# Patient Record
Sex: MALE | Employment: FULL TIME | URBAN - METROPOLITAN AREA
[De-identification: names, ages, dates, MRNs, and addresses within clinical notes are randomized per-mention and may not be internally consistent; named-entity substitution may affect disease eponyms.]

---

## 2020-02-11 ENCOUNTER — HOSPITAL ENCOUNTER (EMERGENCY)
Age: 20
Discharge: HOME | End: 2020-02-11
Attending: EMERGENCY MEDICINE
Payer: COMMERCIAL

## 2020-02-11 ENCOUNTER — APPOINTMENT (OUTPATIENT)
Dept: GENERAL RADIOLOGY | Age: 20
End: 2020-02-11
Attending: PHYSICIAN ASSISTANT
Payer: COMMERCIAL

## 2020-02-11 VITALS
HEART RATE: 88 BPM | TEMPERATURE: 98.3 F | DIASTOLIC BLOOD PRESSURE: 90 MMHG | RESPIRATION RATE: 18 BRPM | OXYGEN SATURATION: 100 % | SYSTOLIC BLOOD PRESSURE: 157 MMHG

## 2020-02-11 DIAGNOSIS — S62.622B OPEN DISPLACED FRACTURE OF MIDDLE PHALANX OF RIGHT MIDDLE FINGER, INITIAL ENCOUNTER: ICD-10-CM

## 2020-02-11 DIAGNOSIS — S68.119A AMPUTATION OF TIP OF FINGER, INITIAL ENCOUNTER: Primary | ICD-10-CM

## 2020-02-11 DIAGNOSIS — S62.623B OPEN DISPLACED FRACTURE OF MIDDLE PHALANX OF LEFT MIDDLE FINGER, INITIAL ENCOUNTER: ICD-10-CM

## 2020-02-11 DIAGNOSIS — S62.609B: ICD-10-CM

## 2020-02-11 PROCEDURE — 74011250636 HC RX REV CODE- 250/636: Performed by: NURSE PRACTITIONER

## 2020-02-11 PROCEDURE — 96365 THER/PROPH/DIAG IV INF INIT: CPT

## 2020-02-11 PROCEDURE — 90715 TDAP VACCINE 7 YRS/> IM: CPT | Performed by: PHYSICIAN ASSISTANT

## 2020-02-11 PROCEDURE — 90471 IMMUNIZATION ADMIN: CPT

## 2020-02-11 PROCEDURE — 74011250637 HC RX REV CODE- 250/637: Performed by: NURSE PRACTITIONER

## 2020-02-11 PROCEDURE — 73130 X-RAY EXAM OF HAND: CPT

## 2020-02-11 PROCEDURE — 74011000250 HC RX REV CODE- 250: Performed by: NURSE PRACTITIONER

## 2020-02-11 PROCEDURE — 99283 EMERGENCY DEPT VISIT LOW MDM: CPT

## 2020-02-11 PROCEDURE — 75810000294 HC INTERM/LAYERED WND RPR

## 2020-02-11 PROCEDURE — 74011250636 HC RX REV CODE- 250/636: Performed by: PHYSICIAN ASSISTANT

## 2020-02-11 PROCEDURE — 96366 THER/PROPH/DIAG IV INF ADDON: CPT

## 2020-02-11 RX ORDER — IBUPROFEN 600 MG/1
600 TABLET ORAL
Qty: 20 TAB | Refills: 0 | Status: SHIPPED | OUTPATIENT
Start: 2020-02-11

## 2020-02-11 RX ORDER — OXYCODONE AND ACETAMINOPHEN 5; 325 MG/1; MG/1
1 TABLET ORAL
Qty: 8 TAB | Refills: 0 | Status: SHIPPED | OUTPATIENT
Start: 2020-02-11 | End: 2020-02-13 | Stop reason: DRUGHIGH

## 2020-02-11 RX ORDER — CEFAZOLIN SODIUM 1 G/3ML
1 INJECTION, POWDER, FOR SOLUTION INTRAMUSCULAR; INTRAVENOUS
Status: DISCONTINUED | OUTPATIENT
Start: 2020-02-11 | End: 2020-02-11

## 2020-02-11 RX ORDER — CEFAZOLIN SODIUM 1 G/3ML
1 INJECTION, POWDER, FOR SOLUTION INTRAMUSCULAR; INTRAVENOUS
Status: DISCONTINUED | OUTPATIENT
Start: 2020-02-11 | End: 2020-02-11 | Stop reason: SDUPTHER

## 2020-02-11 RX ORDER — LIDOCAINE HYDROCHLORIDE 10 MG/ML
5 INJECTION, SOLUTION EPIDURAL; INFILTRATION; INTRACAUDAL; PERINEURAL ONCE
Status: COMPLETED | OUTPATIENT
Start: 2020-02-11 | End: 2020-02-11

## 2020-02-11 RX ORDER — CEPHALEXIN 500 MG/1
500 CAPSULE ORAL 4 TIMES DAILY
Qty: 28 CAP | Refills: 0 | Status: SHIPPED | OUTPATIENT
Start: 2020-02-11 | End: 2020-02-18

## 2020-02-11 RX ORDER — OXYCODONE AND ACETAMINOPHEN 5; 325 MG/1; MG/1
1 TABLET ORAL
Status: COMPLETED | OUTPATIENT
Start: 2020-02-11 | End: 2020-02-11

## 2020-02-11 RX ORDER — CEFAZOLIN SODIUM 2 G/50ML
2 SOLUTION INTRAVENOUS ONCE
Status: COMPLETED | OUTPATIENT
Start: 2020-02-11 | End: 2020-02-11

## 2020-02-11 RX ADMIN — LIDOCAINE HYDROCHLORIDE 5 ML: 10 INJECTION, SOLUTION EPIDURAL; INFILTRATION; INTRACAUDAL; PERINEURAL at 15:07

## 2020-02-11 RX ADMIN — CEFAZOLIN SODIUM 2 G: 2 SOLUTION INTRAVENOUS at 15:18

## 2020-02-11 RX ADMIN — TETANUS TOXOID, REDUCED DIPHTHERIA TOXOID AND ACELLULAR PERTUSSIS VACCINE, ADSORBED 0.5 ML: 5; 2.5; 8; 8; 2.5 SUSPENSION INTRAMUSCULAR at 15:00

## 2020-02-11 RX ADMIN — OXYCODONE HYDROCHLORIDE AND ACETAMINOPHEN 1 TABLET: 5; 325 TABLET ORAL at 15:00

## 2020-02-11 NOTE — ED PROVIDER NOTES
EMERGENCY DEPARTMENT HISTORY AND PHYSICAL EXAM    1:35 PM      Date: 2/11/2020  Patient Name: William Batista    History of Presenting Illness     Chief Complaint   Patient presents with    Finger Pain         History Provided By: Patient    Additional History (Context): William Batista is a 23 y.o. male with  No significant past medical history who presents with injury to both middle fingers . Pt was at work and got both of his 3rd digits caught in between metal scaffold. Pt lost tip of left finger and acquired a laceration to right tip of middle finger. Pt does not know when his last tetanus shot was. Pt reports constant pain. PCP: Michell Anne MD    Current Facility-Administered Medications   Medication Dose Route Frequency Provider Last Rate Last Dose    ceFAZolin (ANCEF) 2g IVPB in 50 mL D5W  2 g IntraVENous ONCE Master Owen NP         Current Outpatient Medications   Medication Sig Dispense Refill    cephALEXin (KEFLEX) 500 mg capsule Take 1 Cap by mouth four (4) times daily for 7 days. 28 Cap 0    oxyCODONE-acetaminophen (PERCOCET) 5-325 mg per tablet Take 1 Tab by mouth every six (6) hours as needed for Pain for up to 3 days. Max Daily Amount: 4 Tabs. 8 Tab 0    ibuprofen (MOTRIN) 600 mg tablet Take 1 Tab by mouth every six (6) hours as needed for Pain. 20 Tab 0       Past History     Past Medical History:  No past medical history on file. Past Surgical History:  No past surgical history on file. Family History:  No family history on file. Social History:  Social History     Tobacco Use    Smoking status: Not on file   Substance Use Topics    Alcohol use: Not on file    Drug use: Not on file       Allergies:  No Known Allergies      Review of Systems       Review of Systems   Constitutional: Negative for chills and fever. Respiratory: Negative for shortness of breath. Cardiovascular: Negative for chest pain. Gastrointestinal: Negative for abdominal pain, nausea and vomiting. Skin: Positive for wound. Negative for rash. Neurological: Negative for weakness. All other systems reviewed and are negative. Physical Exam     Visit Vitals  /90 (BP 1 Location: Right arm, BP Patient Position: At rest)   Pulse 88   Temp 98.3 °F (36.8 °C)   Resp 18   SpO2 100%         Physical Exam  Vitals signs reviewed. Constitutional:       General: He is not in acute distress. Appearance: Normal appearance. He is well-developed. He is not ill-appearing or toxic-appearing. HENT:      Head: Normocephalic and atraumatic. Eyes:      Conjunctiva/sclera: Conjunctivae normal.      Pupils: Pupils are equal, round, and reactive to light. Neck:      Musculoskeletal: Normal range of motion. Trachea: Trachea normal.   Cardiovascular:      Rate and Rhythm: Normal rate and regular rhythm. Pulmonary:      Effort: Pulmonary effort is normal.      Breath sounds: Normal breath sounds. Abdominal:      General: Bowel sounds are normal. There is no distension or abdominal bruit. Palpations: Abdomen is soft. Abdomen is not rigid. There is no shifting dullness, fluid wave, mass or pulsatile mass. Tenderness: There is no abdominal tenderness. There is no guarding. Negative signs include Bush's sign and McBurney's sign. Musculoskeletal:      Right hand: He exhibits laceration. Decreased sensation is not present in the ulnar distribution, is not present in the medial distribution and is not present in the radial distribution. He exhibits no finger abduction, no thumb/finger opposition and no wrist extension trouble. Left hand: He exhibits tenderness and laceration. Normal strength noted. He exhibits no finger abduction, no thumb/finger opposition and no wrist extension trouble. Hands:       Comments: Tip of left finger missing. Bone exposed. Most of the nail is gone, only a piece of lunula in place. Painful. Bleeding controlled.    Laceration to right tip of finger about 3 cm, nail intact. Muscle tissue exposed, minimal bleeding. Bone not visible. Radial pulses intact. Neurological:      General: No focal deficit present. Mental Status: He is alert and oriented to person, place, and time. Wound Repair  Date/Time: 2/11/2020 3:09 PM  Preparation: skin prepped with Betadine, skin prepped with ChloraPrep and sterile field established  Location details: right long finger  Wound length:2.6 - 7.5 cm  Anesthesia: digital block and local infiltration    Anesthesia:  Local Anesthetic: lidocaine 1% without epinephrine  Anesthetic total: 8 mL  Foreign bodies: no foreign bodies  Irrigation solution: saline  Irrigation method: syringe  Debridement: extensive  Wound skin closure material used: 3-0 nylon. Number of sutures: 5  Technique: simple and interrupted  Approximation: loose  Dressing: tube gauze  Patient tolerance: Patient tolerated the procedure well with no immediate complications  My total time at bedside, performing this procedure was 1-15 minutes. Diagnostic Study Results     Labs -  No results found for this or any previous visit (from the past 12 hour(s)). Radiologic Studies -   XR HAND RT MIN 3 V   Final Result   IMPRESSION:      Minimally displaced fracture of the distal tuft of the right lung finger, as   described. No other significant bony abnormality is seen. XR HAND LT MIN 3 V   Final Result   IMPRESSION:      Partial amputation of the distal tip of the left long finger including the soft   tissues and small distal portion of the tuft. Additional fracture of the distal   staff with subtle volar angulation. No other significant bony abnormality is seen. Medical Decision Making   I am the first provider for this patient. I reviewed the vital signs, available nursing notes, past medical history, past surgical history, family history and social history. Vital Signs-Reviewed the patient's vital signs.     Records Reviewed: Nursing Notes and Old Medical Records (Time of Review: 1:35 PM)    ED Course: Progress Notes, Reevaluation, and Consults:      Provider Notes (Medical Decision Making):   Pt in pain from bilateral finger injuries. Pt had left tip of middle finger amputated at work from a crush injury and acquired a finger lac to the right tip of finger. Xray showed a small minimally displaced fracture of the distal volar  portion of the distal tuft of the distal phalanx of the long finger of right hand. Left hand xray showed partial amputation of the distal tip of the left long finger including the soft tissues and small distal portion of the tuft. Additional fracture of the distal staff with subtle volar angulation. Both wounds were extensively irrigated and soaked with betadine and normal saline prior to repair. Right finger lac was repaired. Stitches placed, used nail as anchor. Tdap updated. Iv antibiotics ordered. Bilateral finger splints placed. 26 Consulted with Dr. Dudley Garcia orthopedic and suggested to have pt go to the office tomorrow at 8am for surgery of the left fingertip. Pt to be NPO after midnight. Pt to have IV antibiotics during visit. 1430 Patient and mother made aware of plan of care. Mother stated she spoke to her orthopedic doctor at [facility] and they will see patient this evening.   3:29 PM Mother reports she will go to Dr. Dudley Garcia as scheduled tomorrow morning instead of her orthopedic because they canceled the appointment. Diagnosis     Clinical Impression:   1. Amputation of tip of finger, initial encounter    2. Open nondisplaced fracture of phalanx of finger, unspecified finger, unspecified phalanx, initial encounter    3. Open displaced fracture of middle phalanx of right middle finger, initial encounter    4.  Open displaced fracture of middle phalanx of left middle finger, initial encounter        Disposition: home     Follow-up Information     Follow up With Specialties Details Why Contact Info    Lacey Nielsen MD [facility]  For suture removal [address]  [phone]      [facility] EMERGENCY DEPT Emergency Medicine  If symptoms worsen [address]  [phone]    Jerrod Greene MD Orthopedic Surgery In 1 day tomorrow at [address], fasting [address] [address]      [address] [facility] [place]  Schedule an appointment as soon as possible for a visit as scheduled [address] [facility]  [address] [address] [phone]           Patient's Medications   Start Taking    CEPHALEXIN (KEFLEX) 500 MG CAPSULE    Take 1 Cap by mouth four (4) times daily for 7 days. IBUPROFEN (MOTRIN) 600 MG TABLET    Take 1 Tab by mouth every six (6) hours as needed for Pain. OXYCODONE-ACETAMINOPHEN (PERCOCET) 5-325 MG PER TABLET    Take 1 Tab by mouth every six (6) hours as needed for Pain for up to 3 days. Max Daily Amount: 4 Tabs. Continue Taking    No medications on file   These Medications have changed    No medications on file   Stop Taking    No medications on file       Dictation disclaimer:  Please note that this dictation was completed with [email or web address], the computer voice recognition software. Quite often unanticipated grammatical, syntax, homophones, and other interpretive errors are inadvertently transcribed by the computer software. Please disregard these errors. Please excuse any errors that have escaped final proofreading.

## 2020-02-11 NOTE — DISCHARGE INSTRUCTIONS
Patient Education        Finger Fracture: Care Instructions  Your Care Instructions    Breaks in the bones of the finger usually heal well in about 3 to 4 weeks. The pain and swelling from a broken finger can last for weeks. But it should steadily improve, starting a few days after you break it. It is very important that you wear and take care of the cast or splint exactly as your doctor tells you to so that your finger heals properly and does not end up crooked. Wearing a splint may interfere with your normal activities. Ask for help with daily tasks if you need it. You heal best when you take good care of yourself. Eat a variety of healthy foods, and don't smoke. Follow-up care is a key part of your treatment and safety. Be sure to make and go to all appointments, and call your doctor if you are having problems. It's also a good idea to know your test results and keep a list of the medicines you take. How can you care for yourself at home? · If your doctor put a splint on your finger, wear the splint exactly as directed. Do not remove it until your doctor says that you can. · Keep your hand raised above the level of your heart as much as you can. This will help reduce swelling. · Put ice or a cold pack on your finger for 10 to 20 minutes at a time. Try to do this every 1 to 2 hours for the next 3 days (when you are awake) or until the swelling goes down. Put a thin cloth between the ice and your skin. Keep the splint dry. · Be safe with medicines. Take pain medicines exactly as directed. ? If the doctor gave you a prescription medicine for pain, take it as prescribed. ? If you are not taking a prescription pain medicine, ask your doctor if you can take an over-the-counter medicine. When should you call for help? Call 911 anytime you think you may need emergency care.  For example, call if:    · Your finger is cool or pale or changes color.    Call your doctor now or seek immediate medical care if:    · Your pain gets much worse.     · You have tingling, weakness, or numbness in your finger.     · You have signs of infection, such as:  ? Increased pain, swelling, warmth, or redness. ? Red streaks leading from the area. ? Pus draining from the area. ? Swollen lymph nodes in your neck, armpits, or groin. ? A fever.    Watch closely for changes in your health, and be sure to contact your doctor if:    · Your finger is not steadily improving. Where can you learn more? Go to http://luis-iesha.info/. Enter R780 in the search box to learn more about \"Finger Fracture: Care Instructions. \"  Current as of: June 26, 2019  Content Version: 12.2  © 3441-1438 [employer]. Care instructions adapted under license by [employer] (which disclaims liability or warranty for this information). If you have questions about a medical condition or this instruction, always ask your healthcare professional. [place] [address] any warranty or liability for your use of this information. Patient Education        Fingertip Amputation: Care Instructions  Your Care Instructions  Fingertip amputation is a common injury. Treatment depends on how much skin, tissue, bone, and nail were damaged and how much of your finger or thumb was cut off. The doctor may have put stitches in your finger. You may need to see a hand surgeon for more treatment. Your fingertips have many nerves and are very sensitive, so the injury may be very painful. Recovery can take several weeks. Your finger may be sensitive to cold and painful for a year or more. You probably will have a splint to protect your finger as it heals. It is very important that you wear the splint exactly as your doctor tells you. Wearing a splint may interfere with your normal activities. Ask for help with daily tasks if you need it. The doctor has checked you carefully, but problems can develop later.  If you notice any problems or new symptoms, get medical treatment right away. Follow-up care is a key part of your treatment and safety. Be sure to make and go to all appointments, and call your doctor if you are having problems. It's also a good idea to know your test results and keep a list of the medicines you take. How can you care for yourself at home? · If your doctor put a splint on your finger, wear the splint exactly as directed. Keep the splint dry. Do not remove it until your doctor says you can. · Keep the cut dry for the first 24 to 48 hours. After this, you can shower if your doctor says it is okay. Pat the cut dry. · Don't soak the cut, such as in a bathtub. Your doctor will tell you when it's safe to get the cut wet. · If your doctor told you how to care for your cut, follow your doctor's instructions. If you did not get instructions, follow this general advice:  ? After the first 24 to 48 hours, wash around the cut with clean water 2 times a day. Don't use hydrogen peroxide or alcohol, which can slow healing. ? You may cover the cut with a thin layer of petroleum jelly, such as Vaseline, and a nonstick bandage. ? Apply more petroleum jelly and replace the bandage as needed. ? Prop up the sore hand on a pillow anytime you sit or lie down during the next 3 days. Try to keep it above the level of your heart. This will help reduce swelling. ? Avoid any activity that could cause your cut to reopen. ? Do not remove the stitches on your own. Your doctor will tell you when to come back to have the stitches removed. · Be safe with medicines. Take pain medicines exactly as directed. ? If the doctor gave you a prescription medicine for pain, take it as prescribed. ? If you are not taking a prescription pain medicine, ask your doctor if you can take an over-the-counter medicine. · If your doctor prescribed antibiotics, take them as directed. Do not stop taking them just because you feel better.  You need to take the full course of antibiotics. When should you call for help? Call your doctor now or seek immediate medical care if:    · You have new pain, or your pain gets worse.     · The skin near the wound is cool or pale or changes color.     · The wound starts to bleed, and blood soaks through the bandage. Oozing small amounts of blood is normal.     · Your wound comes open.     · You have symptoms of infection, such as:  ? Increased pain, swelling, warmth, or redness near the wound. ? Red streaks leading from the wound. ? Pus draining from the wound. ? A fever.    Watch closely for changes in your health, and be sure to contact your doctor if:    · You do not get better as expected. Where can you learn more? Go to http://luis-iesha.info/. Enter 725 0628 in the search box to learn more about \"Fingertip Amputation: Care Instructions. \"  Current as of: June 26, 2019  Content Version: 12.2  © 0330-1918 [employer]. Care instructions adapted under license by [employer] (which disclaims liability or warranty for this information). If you have questions about a medical condition or this instruction, always ask your healthcare professional. [place] [address] any warranty or liability for your use of this information. Patient Education        Wearing a Splint: Care Instructions  Your Care Instructions    A splint protects a broken bone or other injury. If you have a removable splint, follow your doctor's instructions and only remove the splint if your doctor says it's okay. Most splints can be adjusted. Your doctor will show you how to do this and will tell you when you might need to adjust the splint. A splint is sometimes called a brace. You may also hear it called an immobilizer. An immobilizer, such as a splint or cast, keeps you from moving the injured area. You may get a splint that's already factory-made.  Or your doctor might make your splint from plaster or fiberglass. Some splints have a built-in air cushion. Air pads are inflated to hold the injured area in place. Follow-up care is a key part of your treatment and safety. Be sure to make and go to all appointments, and call your doctor if you are having problems. It's also a good idea to know your test results and keep a list of the medicines you take. How can you care for yourself at home? General care  · Follow your doctor's instructions on how much weight you can put on your injured limb. · If the fingers or toes on the limb with the splint were not injured, wiggle them every now and then. This helps move the blood and fluids in the injured limb. · Prop up the injured limb on a pillow when you ice it or anytime you sit or lie down during the next 3 days. Try to keep it above the level of your heart. This will help reduce swelling. · Put ice or cold packs on the limb for 10 to 20 minutes at a time. Try to do this every 1 to 2 hours for the next 3 days (when you are awake) or until the swelling goes down. Be careful not to get the splint wet. Put a thin cloth between the ice and your skin. If your splint is removable, ask your doctor if you can take it off when you use ice. · If you have an adjustable splint that feels too tight, loosen it slightly. · Keep up your muscle strength and tone as much as you can while protecting your injured limb. Your doctor may want you to tense and relax the muscles protected by the splint. Check with your doctor or your physical or occupational therapist for instructions. Splint and skin care  · If your splint is not to be removed, try blowing cool air from a hair dryer or fan into the splint to help relieve itching. Never stick items under your splint to scratch the skin. · Do not use oils or lotions near your splint.  If the skin becomes red or sore around the edge of the splint, you may pad the edges with a soft material, such as moleskin, or use tape to cover the edges. · If you're allowed to take your splint off, be sure your skin is dry before you put it back on. Be careful not to put the splint on too tightly. · Check the skin under the splint every day. If you can't remove the splint, check the skin around the edges. Tell your doctor if you see redness or sores. Water and your splint  · Keep your splint dry. Moisture can collect under the splint and cause skin irritation and itching. If you have a wound or have had surgery, moisture under the splint can increase the risk of infection. · Tape a sheet of plastic to cover your splint when you take a shower or bath, unless your doctor said you can take it off while bathing. · If you can take the splint off when you bathe, pat the area dry after bathing and put the splint back on.  · If your splint gets a little wet, you can dry it with a hair dryer. Use a \"cool\" setting. When should you call for help? Call your doctor now or seek immediate medical care if:    · You have increased or severe pain.     · You feel a warm or painful spot under the splint.     · You have problems with your splint. For example:  ? The skin under the splint is burning or stinging. ? The splint feels too tight. ? There is a lot of swelling near the splint. (Some swelling is normal.)  ? You have a new fever. ? There is drainage or a bad smell coming from the splint.     · Your limb turns cold or changes color.     · You have trouble moving your fingers or toes.     · You have symptoms of a blood clot in your arm or leg (called a deep vein thrombosis). These may include:  ? Pain in the arm, calf, back of the knee, thigh, or groin. ? Redness and swelling in the arm, leg, or groin.    Watch closely for changes in your health, and be sure to contact your doctor if:    · The splint is breaking apart or losing its shape.     · You are not getting better as expected. Where can you learn more?   Go to http://luis-iesha.info/. Enter R496 in the search box to learn more about \"Wearing a Splint: Care Instructions. \"  Current as of: June 26, 2019  Content Version: 12.2  © 0029-0812 [employer]. Care instructions adapted under license by [employer] (which disclaims liability or warranty for this information). If you have questions about a medical condition or this instruction, always ask your healthcare professional. [place] [address] any warranty or liability for your use of this information. Follow up with orthopedic doctor tomorrow morning at 8 am, fasting. Do not eat after midnight tonight. Return to ED if symptoms worsen. Stitches to be removed in 10-14 days.

## 2020-02-11 NOTE — ED NOTES
Finger splint applied and bandage to left middle finger changed X 3 for seeping blood. Bleeding other wise is controlled    I have reviewed discharge instructions with the patient. The patient verbalized understanding. Pt left ED in stable condition with no distress noted and no complaints voiced. Both middle finger splints applied. Bleeding controlled upon leaving ED .

## 2020-02-11 NOTE — ED TRIAGE NOTES
The patient presents for evaluation of crushing injury to his bilateral 3rd digits. Last Td unknown.

## 2020-02-12 ENCOUNTER — OFFICE VISIT (OUTPATIENT)
Dept: ORTHOPEDIC SURGERY | Facility: CLINIC | Age: 20
End: 2020-02-12

## 2020-02-12 VITALS
RESPIRATION RATE: 16 BRPM | WEIGHT: 156 LBS | BODY MASS INDEX: 21.13 KG/M2 | DIASTOLIC BLOOD PRESSURE: 84 MMHG | HEART RATE: 76 BPM | TEMPERATURE: 97.7 F | OXYGEN SATURATION: 100 % | HEIGHT: 72 IN | SYSTOLIC BLOOD PRESSURE: 129 MMHG

## 2020-02-12 DIAGNOSIS — S68.623A PARTIAL TRAUMATIC AMPUTATION OF LEFT MIDDLE FINGER THROUGH PHALANX, INITIAL ENCOUNTER: Primary | ICD-10-CM

## 2020-02-12 DIAGNOSIS — S62.662B OPEN NONDISPLACED FRACTURE OF DISTAL PHALANX OF RIGHT MIDDLE FINGER, INITIAL ENCOUNTER: ICD-10-CM

## 2020-02-12 NOTE — PROGRESS NOTES
Nany Carson is a 23 y.o. male right handed . Worker's Compensation and legal considerations: none filed. Vitals:    02/12/20 0758   BP: 129/84   Pulse: 76   Resp: 16   Temp: 97.7 °F (36.5 °C)   TempSrc: Oral   SpO2: 100%   Weight: 156 lb (70.8 kg)   Height: 6' (1.829 m)   PainSc:   4   PainLoc: Hand           Chief Complaint   Patient presents with    Hand Pain     Scooter         HPI: Patient comes in today with a history of traumatic injury to both his left middle and right middle fingers. He was seen in the emergency department yesterday for significant bleeding in his left middle finger. He was diagnosed with a left middle finger partial tip amputation and a right middle finger laceration involving the distal aspect of the nailbed. He said they had issues with bleeding when he was in the emergency department and I placed him into a soft dressing. He was also given IV antibiotics as well as a prescription for antibiotics and pain medicine. Date of onset:  2/11/2020    Injury: Yes: Comment: crush    Prior Treatment:  Yes: Comment: dressing and antibiotics    Numbness/ Tingling: Yes: Comment: left middle finger    ROS: Review of Systems - General ROS: negative  Respiratory ROS: no cough, shortness of breath, or wheezing  Cardiovascular ROS: no chest pain or dyspnea on exertion  Musculoskeletal ROS: positive for - pain in finger - left  Neurological ROS: negative  Dermatological ROS: negative    History reviewed. No pertinent past medical history. Past Surgical History:   Procedure Laterality Date    [place] ANESTH,SURGERY OF SHOULDER         Current Outpatient Medications   Medication Sig Dispense Refill    cephALEXin (KEFLEX) 500 mg capsule Take 1 Cap by mouth four (4) times daily for 7 days. 28 Cap 0    oxyCODONE-acetaminophen (PERCOCET) 5-325 mg per tablet Take 1 Tab by mouth every six (6) hours as needed for Pain for up to 3 days. Max Daily Amount: 4 Tabs.  8 Tab 0    ibuprofen (MOTRIN) 600 mg tablet Take 1 Tab by mouth every six (6) hours as needed for Pain. 20 Tab 0       No Known Allergies      PE:     Physical Exam  Vitals signs and nursing note reviewed. Constitutional:       General: He is not in acute distress. Appearance: Normal appearance. He is normal weight. HENT:      Head: Normocephalic and atraumatic. Nose: Nose normal.   Eyes:      Pupils: Pupils are equal, round, and reactive to light. Neck:      Musculoskeletal: Normal range of motion. No neck rigidity. Cardiovascular:      Pulses: Normal pulses. Pulmonary:      Effort: Pulmonary effort is normal. No respiratory distress. Abdominal:      General: Abdomen is flat. There is no distension. Musculoskeletal:         General: Swelling, tenderness, deformity and signs of injury present. Right lower leg: No edema. Left lower leg: No edema. Skin:     General: Skin is warm. Capillary Refill: Capillary refill takes less than 2 seconds. Neurological:      General: No focal deficit present. Mental Status: He is alert and oriented to person, place, and time. Cranial Nerves: No cranial nerve deficit. Sensory: No sensory deficit. Motor: No weakness. Coordination: Coordination normal.      Gait: Gait normal.      Deep Tendon Reflexes: Reflexes normal.   Psychiatric:         Mood and Affect: Mood normal.         Behavior: Behavior normal.         Thought Content: Thought content normal.         Judgment: Judgment normal.         Left Hand there is a partial tip amputation to the middle finger distally with exposed bone. However there is a clot of blood over the top of this. Right Hand: There is also a laceration over the distal aspect of the dorsal middle finger with involvement of the distal nailbed and nail plate.     Imagin2020 Films:    Left IMPRESSION:     Partial amputation of the distal tip of the left long finger including the soft  tissues and small distal portion of the tuft. Additional fracture of the distal  staff with subtle volar angulation.     No other significant bony abnormality is seen. Right IMPRESSION:     Minimally displaced fracture of the distal tuft of the right lung finger, as  described.     No other significant bony abnormality is seen. ICD-10-CM ICD-9-CM    1. Partial traumatic amputation of left middle finger through phalanx, initial encounter S68.623A 886.0    2. Open nondisplaced fracture of distal phalanx of right middle finger, initial encounter S62.662B 816.12        Plan: At this point we will set him up for a left middle finger revision amputation tomorrow afternoon at the surgery center. I have instructed him to complete his antibiotics as well as take pain medicine as needed. We have consented the patient today we will see him back in 10 to 14 days for removal of right middle finger sutures and evaluation of left little finger.     Plan was reviewed with patient, who verbalized agreement and understanding of the plan

## 2020-02-13 DIAGNOSIS — S62.662B OPEN NONDISPLACED FRACTURE OF DISTAL PHALANX OF RIGHT MIDDLE FINGER, INITIAL ENCOUNTER: ICD-10-CM

## 2020-02-13 DIAGNOSIS — S68.623A PARTIAL TRAUMATIC AMPUTATION OF LEFT MIDDLE FINGER THROUGH PHALANX, INITIAL ENCOUNTER: Primary | ICD-10-CM

## 2020-02-13 RX ORDER — OXYCODONE AND ACETAMINOPHEN 7.5; 325 MG/1; MG/1
1 TABLET ORAL
Qty: 20 TAB | Refills: 0 | Status: SHIPPED | OUTPATIENT
Start: 2020-02-13 | End: 2020-02-18

## 2020-02-27 ENCOUNTER — OFFICE VISIT (OUTPATIENT)
Dept: ORTHOPEDIC SURGERY | Age: 20
End: 2020-02-27

## 2020-02-27 VITALS
WEIGHT: 157 LBS | DIASTOLIC BLOOD PRESSURE: 84 MMHG | HEIGHT: 72 IN | HEART RATE: 74 BPM | SYSTOLIC BLOOD PRESSURE: 134 MMHG | BODY MASS INDEX: 21.26 KG/M2 | RESPIRATION RATE: 15 BRPM | TEMPERATURE: 97.6 F

## 2020-02-27 DIAGNOSIS — S68.623D PARTIAL TRAUMATIC AMPUTATION OF LEFT MIDDLE FINGER THROUGH PHALANX, SUBSEQUENT ENCOUNTER: Primary | ICD-10-CM

## 2020-02-27 DIAGNOSIS — S62.662D OPEN NONDISPLACED FRACTURE OF DISTAL PHALANX OF RIGHT MIDDLE FINGER WITH ROUTINE HEALING, SUBSEQUENT ENCOUNTER: ICD-10-CM

## 2020-02-27 NOTE — PROGRESS NOTES
Damian Verde is a 23 y.o. male right handed . Worker's Compensation and legal considerations: none filed. Vitals:    02/27/20 1354   BP: 134/84   Pulse: 74   Resp: 15   Temp: 97.6 °F (36.4 °C)   TempSrc: Oral   Weight: 157 lb (71.2 kg)   Height: 6' (1.829 m)   PainSc:   0 - No pain   PainLoc: Finger           Chief Complaint   Patient presents with    Finger Pain     left middle       HPI: Patient comes in today for follow-up approximately 2 weeks status post left middle finger revision amputation with VY advancement flap. He reports to be doing well with no pain. He has been in the dressing and splint since his surgery. Initial HPI: Patient comes in today with a history of traumatic injury to both his left middle and right middle fingers. He was seen in the emergency department yesterday for significant bleeding in his left middle finger. He was diagnosed with a left middle finger partial tip amputation and a right middle finger laceration involving the distal aspect of the nailbed. He said they had issues with bleeding when he was in the emergency department and I placed him into a soft dressing. He was also given IV antibiotics as well as a prescription for antibiotics and pain medicine. Date of onset:  2/11/2020    Injury: Yes: Comment: crush    Prior Treatment:  Yes: Comment: dressing and antibiotics    Numbness/ Tingling: Yes: Comment: left middle finger    ROS: Review of Systems - General ROS: negative  Respiratory ROS: no cough, shortness of breath, or wheezing  Cardiovascular ROS: no chest pain or dyspnea on exertion  Musculoskeletal ROS: positive for - pain in finger - left  Neurological ROS: negative  Dermatological ROS: negative    History reviewed. No pertinent past medical history.     Past Surgical History:   Procedure Laterality Date    [place] ANESTH,SURGERY OF SHOULDER         Current Outpatient Medications   Medication Sig Dispense Refill    ibuprofen (MOTRIN) 600 mg tablet Take 1 Tab by mouth every six (6) hours as needed for Pain. 20 Tab 0       No Known Allergies      PE:     Left Hand: Wound is clean dry and intact and flap appears to be viable. There is no area of dysvascular tissue. Nailbed is intact with no evidence of laceration. Right Hand: Middle finger wound has healed. Sensation is intact distally and range of motion is full. Imagin2020 Films:    Left IMPRESSION:     Partial amputation of the distal tip of the left long finger including the soft  tissues and small distal portion of the tuft. Additional fracture of the distal  staff with subtle volar angulation.     No other significant bony abnormality is seen. Right IMPRESSION:     Minimally displaced fracture of the distal tuft of the right lung finger, as  described.     No other significant bony abnormality is seen. ICD-10-CM ICD-9-CM    1. Partial traumatic amputation of left middle finger through phalanx, subsequent encounter S68.623D V54.89    2. Open nondisplaced fracture of distal phalanx of right middle finger with routine healing, subsequent encounter S62.662D V54.19        Plan:     I have instructed the patient to wash his fingers daily and to change Band-Aid at least once a day. Follow-up in 4 weeks for reevaluation. He may return to work as long as he has no heavy lifting or heavy duty on the left side.     Plan was reviewed with patient, who verbalized agreement and understanding of the plan

## 2020-03-26 ENCOUNTER — VIRTUAL VISIT (OUTPATIENT)
Dept: ORTHOPEDIC SURGERY | Age: 20
End: 2020-03-26

## 2020-03-26 DIAGNOSIS — S68.623D PARTIAL TRAUMATIC AMPUTATION OF LEFT MIDDLE FINGER THROUGH PHALANX, SUBSEQUENT ENCOUNTER: Primary | ICD-10-CM

## 2020-03-26 DIAGNOSIS — S62.662D OPEN NONDISPLACED FRACTURE OF DISTAL PHALANX OF RIGHT MIDDLE FINGER WITH ROUTINE HEALING, SUBSEQUENT ENCOUNTER: ICD-10-CM

## 2020-03-26 NOTE — PROGRESS NOTES
Kalpesh Arnold is a 23 y.o. male evaluated via telephone on 3/26/2020. Consent:  He and/or health care decision maker is aware that that he may receive a bill for this telephone service, depending on his insurance coverage, and has provided verbal consent to proceed: Yes      Documentation:  I communicated with the patient and/or health care decision maker about his left middle finger post op. Details of this discussion including any medical advice provided: Patient has no pain or complaints about his left middle finger today. I affirm this is a Patient Initiated Episode with an Established Patient who has not had a related appointment within my department in the past 7 days or scheduled within the next 24 hours. Note: not billable if this call serves to triage the patient into an appointment for the relevant concern    Since your last office visit have you had any    1. New medical diagnoses No  2. Changes in your medications  No  3. Surgical procedures No  4. Changes in your Allergies to medication No  5.  What is your pain level today 0/10     Leopold Riffle, LPN

## 2020-03-26 NOTE — PROGRESS NOTES
Jessie Umaña is a 23 y.o. male who was seen by synchronous (real-time) audio-video technology on 3/26/2020. Consent:  He and/or his healthcare decision maker is aware that this patient-initiated Telehealth encounter is a billable service, with coverage as determined by his insurance carrier. He is aware that he may receive a bill and has provided verbal consent to proceed: Yes    I was in the office at [facility] via [employer]. me while conducting this encounter. Assessment & Plan:   Diagnoses and all orders for this visit:    1. Partial traumatic amputation of left middle finger through phalanx, subsequent encounter    2. Open nondisplaced fracture of distal phalanx of right middle finger with routine healing, subsequent encounter              Coding Help - Use CPT Codes 68408-50020, 76690-44713 for Established and New Patients respectively, either employing EM elements or Time rules. Other codes (example consult codes) may also apply. I spent at least 15 minutes with this established patient, and >50% of the time was spent counseling and/or coordinating care regarding his left middle finger revision amputation  712  Subjective:   Jessie Umaña was seen for Finger Pain (post op left middle finger )      HPI: Patient is here via virtual visit to examine his left middle finger revision amputation. He is approximately 6 weeks from the surgery and doing very well. He reports that his fingers appearing to be like a normal finger at this time. He reports some increase sensitivity about the tip. He reports full range of motion. Previous HPI: Patient comes in today for follow-up approximately 2 weeks status post left middle finger revision amputation with VY advancement flap. He reports to be doing well with no pain. He has been in the dressing and splint since his surgery. Prior to Admission medications    Medication Sig Start Date End Date Taking?  Authorizing Provider   ibuprofen (MOTRIN) 600 mg tablet Take 1 Tab by mouth every six (6) hours as needed for Pain. 2/11/20  Yes Marie Owen, NP     No Known Allergies    There is no problem list on file for this patient. There are no active problems to display for this patient. Current Outpatient Medications   Medication Sig Dispense Refill    ibuprofen (MOTRIN) 600 mg tablet Take 1 Tab by mouth every six (6) hours as needed for Pain. 20 Tab 0     No Known Allergies  No past medical history on file. Past Surgical History:   Procedure Laterality Date    [place] ANESTH,SURGERY OF SHOULDER       No family history on file. Social History     Tobacco Use    Smoking status: Never Smoker    Smokeless tobacco: Current User   Substance Use Topics    Alcohol use: Not Currently       Review of Systems     PHYSICAL EXAMINATION:  [ INSTRUCTIONS:  \"[x]\" Indicates a positive item  \"[]\" Indicates a negative item  -- DELETE ALL ITEMS NOT EXAMINED]  Vital Signs: (As obtained by patient/caregiver at home)  There were no vitals taken for this visit. Left hand: On virtual exam the middle finger V-Y advancement flap appears to be completely healed. The patient is able to touch the tip without much difficulty. He does report some increased sensitivity when touching it. He shows full range of motion on exam.  He denies any numbness on exam of his middle finger.     Constitutional: [x] Appears well-developed and well-nourished [x] No apparent distress      [] Abnormal -     Mental status: [x] Alert and awake  [x] Oriented to person/place/time [x] Able to follow commands    [] Abnormal -     Eyes:   EOM    [x]  Normal    [] Abnormal -   Sclera  [x]  Normal    [] Abnormal -          Discharge [x]  None visible   [] Abnormal -     HENT: [x] Normocephalic, atraumatic  [] Abnormal -   [x] Mouth/Throat: Mucous membranes are moist    External Ears [x] Normal  [] Abnormal -    Neck: [x] No visualized mass [] Abnormal -     Pulmonary/Chest: [x] Respiratory effort normal   [x] No visualized signs of difficulty breathing or respiratory distress        [] Abnormal -      Musculoskeletal:   [x] Normal gait with no signs of ataxia         [x] Normal range of motion of neck        [] Abnormal -     Neurological:        [x] No Facial Asymmetry (Cranial nerve 7 motor function) (limited exam due to video visit)          [x] No gaze palsy        [] Abnormal -          Skin:        [x] No significant exanthematous lesions or discoloration noted on facial skin         [] Abnormal -            Psychiatric:       [x] Normal Affect [] Abnormal -        [x] No Hallucinations    Other pertinent observable physical exam findings:-        We discussed the expected course, resolution and complications of the diagnosis(es) in detail. Medication risks, benefits, costs, interactions, and alternatives were discussed as indicated. I advised him to contact the office if his condition worsens, changes or fails to improve as anticipated. He expressed understanding with the diagnosis(es) and plan. Pursuant to the emergency declaration under the [facility] [place] [address] waiver authority and the [employer], this Virtual  Visit was conducted, with patient's consent, to reduce the patient's risk of exposure to COVID-19 and provide continuity of care for an established patient. Services were provided through a video synchronous discussion virtually to substitute for in-person clinic visit.     Justus Montelongo DO